# Patient Record
Sex: MALE | Race: WHITE | NOT HISPANIC OR LATINO | Employment: STUDENT | URBAN - METROPOLITAN AREA
[De-identification: names, ages, dates, MRNs, and addresses within clinical notes are randomized per-mention and may not be internally consistent; named-entity substitution may affect disease eponyms.]

---

## 2024-02-02 ENCOUNTER — APPOINTMENT (EMERGENCY)
Dept: ULTRASOUND IMAGING | Facility: HOSPITAL | Age: 19
End: 2024-02-02
Payer: COMMERCIAL

## 2024-02-02 ENCOUNTER — ANESTHESIA EVENT (OUTPATIENT)
Dept: PERIOP | Facility: HOSPITAL | Age: 19
End: 2024-02-02
Payer: COMMERCIAL

## 2024-02-02 ENCOUNTER — ANESTHESIA (OUTPATIENT)
Dept: PERIOP | Facility: HOSPITAL | Age: 19
End: 2024-02-02
Payer: COMMERCIAL

## 2024-02-02 ENCOUNTER — HOSPITAL ENCOUNTER (OUTPATIENT)
Facility: HOSPITAL | Age: 19
Setting detail: OUTPATIENT SURGERY
Discharge: HOME | End: 2024-02-03
Attending: UROLOGY | Admitting: UROLOGY
Payer: COMMERCIAL

## 2024-02-02 ENCOUNTER — HOSPITAL ENCOUNTER (EMERGENCY)
Facility: HOSPITAL | Age: 19
End: 2024-02-02
Attending: EMERGENCY MEDICINE
Payer: COMMERCIAL

## 2024-02-02 VITALS
SYSTOLIC BLOOD PRESSURE: 108 MMHG | DIASTOLIC BLOOD PRESSURE: 56 MMHG | TEMPERATURE: 98.1 F | HEIGHT: 66 IN | RESPIRATION RATE: 18 BRPM | OXYGEN SATURATION: 96 % | HEART RATE: 72 BPM

## 2024-02-02 DIAGNOSIS — N44.00 TESTICULAR TORSION: ICD-10-CM

## 2024-02-02 DIAGNOSIS — N44.00 RIGHT TESTICULAR TORSION: ICD-10-CM

## 2024-02-02 DIAGNOSIS — N44.00 RIGHT TESTICULAR TORSION: Primary | ICD-10-CM

## 2024-02-02 LAB
ALBUMIN SERPL BCP-MCNC: 4.8 G/DL (ref 3.5–5)
ALP SERPL-CCNC: 85 U/L (ref 34–104)
ALT SERPL W P-5'-P-CCNC: 15 U/L (ref 7–52)
ANION GAP SERPL CALCULATED.3IONS-SCNC: 8 MMOL/L
AST SERPL W P-5'-P-CCNC: 18 U/L (ref 13–39)
BASOPHILS # BLD AUTO: 0.06 THOUSANDS/ÂΜL (ref 0–0.1)
BASOPHILS NFR BLD AUTO: 1 % (ref 0–1)
BILIRUB SERPL-MCNC: 0.62 MG/DL (ref 0.2–1)
BUN SERPL-MCNC: 15 MG/DL (ref 5–25)
CALCIUM SERPL-MCNC: 10.1 MG/DL (ref 8.4–10.2)
CHLORIDE SERPL-SCNC: 104 MMOL/L (ref 96–108)
CO2 SERPL-SCNC: 29 MMOL/L (ref 21–32)
CREAT SERPL-MCNC: 0.9 MG/DL (ref 0.6–1.3)
EOSINOPHIL # BLD AUTO: 0.08 THOUSAND/ÂΜL (ref 0–0.61)
EOSINOPHIL NFR BLD AUTO: 1 % (ref 0–6)
ERYTHROCYTE [DISTWIDTH] IN BLOOD BY AUTOMATED COUNT: 13.2 % (ref 11.6–15.1)
GFR SERPL CREATININE-BSD FRML MDRD: 122 ML/MIN/1.73SQ M
GLUCOSE SERPL-MCNC: 94 MG/DL (ref 65–140)
HCT VFR BLD AUTO: 48.9 % (ref 36.5–49.3)
HGB BLD-MCNC: 16 G/DL (ref 12–17)
IMM GRANULOCYTES # BLD AUTO: 0.05 THOUSAND/UL (ref 0–0.2)
IMM GRANULOCYTES NFR BLD AUTO: 0 % (ref 0–2)
LYMPHOCYTES # BLD AUTO: 3.59 THOUSANDS/ÂΜL (ref 0.6–4.47)
LYMPHOCYTES NFR BLD AUTO: 27 % (ref 14–44)
MCH RBC QN AUTO: 28.5 PG (ref 26.8–34.3)
MCHC RBC AUTO-ENTMCNC: 32.7 G/DL (ref 31.4–37.4)
MCV RBC AUTO: 87 FL (ref 82–98)
MONOCYTES # BLD AUTO: 1.15 THOUSAND/ÂΜL (ref 0.17–1.22)
MONOCYTES NFR BLD AUTO: 9 % (ref 4–12)
NEUTROPHILS # BLD AUTO: 8.24 THOUSANDS/ÂΜL (ref 1.85–7.62)
NEUTS SEG NFR BLD AUTO: 62 % (ref 43–75)
NRBC BLD AUTO-RTO: 0 /100 WBCS
PLATELET # BLD AUTO: 283 THOUSANDS/UL (ref 149–390)
PMV BLD AUTO: 8.9 FL (ref 8.9–12.7)
POTASSIUM SERPL-SCNC: 3.9 MMOL/L (ref 3.5–5.3)
PROT SERPL-MCNC: 7.9 G/DL (ref 6.4–8.4)
RBC # BLD AUTO: 5.62 MILLION/UL (ref 3.88–5.62)
SODIUM SERPL-SCNC: 141 MMOL/L (ref 135–147)
WBC # BLD AUTO: 13.17 THOUSAND/UL (ref 4.31–10.16)

## 2024-02-02 PROCEDURE — 80053 COMPREHEN METABOLIC PANEL: CPT | Performed by: EMERGENCY MEDICINE

## 2024-02-02 PROCEDURE — 85025 COMPLETE CBC W/AUTO DIFF WBC: CPT | Performed by: EMERGENCY MEDICINE

## 2024-02-02 PROCEDURE — 88305 TISSUE EXAM BY PATHOLOGIST: CPT | Performed by: PATHOLOGY

## 2024-02-02 PROCEDURE — 54520 REMOVAL OF TESTIS: CPT | Performed by: UROLOGY

## 2024-02-02 PROCEDURE — 76870 US EXAM SCROTUM: CPT

## 2024-02-02 PROCEDURE — NC001 PR NO CHARGE: Performed by: UROLOGY

## 2024-02-02 PROCEDURE — 99284 EMERGENCY DEPT VISIT MOD MDM: CPT

## 2024-02-02 PROCEDURE — 99291 CRITICAL CARE FIRST HOUR: CPT | Performed by: EMERGENCY MEDICINE

## 2024-02-02 PROCEDURE — 54640 ORCHIOPEXY INGUN/SCROT APPR: CPT | Performed by: UROLOGY

## 2024-02-02 PROCEDURE — 36415 COLL VENOUS BLD VENIPUNCTURE: CPT | Performed by: EMERGENCY MEDICINE

## 2024-02-02 PROCEDURE — 88342 IMHCHEM/IMCYTCHM 1ST ANTB: CPT | Performed by: PATHOLOGY

## 2024-02-02 RX ORDER — SUCCINYLCHOLINE/SOD CL,ISO/PF 100 MG/5ML
SYRINGE (ML) INTRAVENOUS AS NEEDED
Status: DISCONTINUED | OUTPATIENT
Start: 2024-02-02 | End: 2024-02-02

## 2024-02-02 RX ORDER — ONDANSETRON 2 MG/ML
4 INJECTION INTRAMUSCULAR; INTRAVENOUS ONCE AS NEEDED
Status: DISCONTINUED | OUTPATIENT
Start: 2024-02-02 | End: 2024-02-02 | Stop reason: HOSPADM

## 2024-02-02 RX ORDER — BUPIVACAINE HYDROCHLORIDE 5 MG/ML
INJECTION, SOLUTION EPIDURAL; INTRACAUDAL AS NEEDED
Status: DISCONTINUED | OUTPATIENT
Start: 2024-02-02 | End: 2024-02-02 | Stop reason: HOSPADM

## 2024-02-02 RX ORDER — CEPHALEXIN 500 MG/1
500 CAPSULE ORAL EVERY 6 HOURS SCHEDULED
Status: DISCONTINUED | OUTPATIENT
Start: 2024-02-03 | End: 2024-02-03 | Stop reason: HOSPADM

## 2024-02-02 RX ORDER — PROPOFOL 10 MG/ML
INJECTION, EMULSION INTRAVENOUS AS NEEDED
Status: DISCONTINUED | OUTPATIENT
Start: 2024-02-02 | End: 2024-02-02

## 2024-02-02 RX ORDER — FENTANYL CITRATE 50 UG/ML
INJECTION, SOLUTION INTRAMUSCULAR; INTRAVENOUS AS NEEDED
Status: DISCONTINUED | OUTPATIENT
Start: 2024-02-02 | End: 2024-02-02

## 2024-02-02 RX ORDER — DEXAMETHASONE SODIUM PHOSPHATE 10 MG/ML
INJECTION, SOLUTION INTRAMUSCULAR; INTRAVENOUS AS NEEDED
Status: DISCONTINUED | OUTPATIENT
Start: 2024-02-02 | End: 2024-02-02

## 2024-02-02 RX ORDER — SODIUM CHLORIDE, SODIUM LACTATE, POTASSIUM CHLORIDE, CALCIUM CHLORIDE 600; 310; 30; 20 MG/100ML; MG/100ML; MG/100ML; MG/100ML
INJECTION, SOLUTION INTRAVENOUS CONTINUOUS PRN
Status: DISCONTINUED | OUTPATIENT
Start: 2024-02-02 | End: 2024-02-02

## 2024-02-02 RX ORDER — FENTANYL CITRATE/PF 50 MCG/ML
50 SYRINGE (ML) INJECTION
Status: DISCONTINUED | OUTPATIENT
Start: 2024-02-02 | End: 2024-02-02 | Stop reason: HOSPADM

## 2024-02-02 RX ORDER — HYDROMORPHONE HCL/PF 1 MG/ML
0.5 SYRINGE (ML) INJECTION
Status: DISCONTINUED | OUTPATIENT
Start: 2024-02-02 | End: 2024-02-02 | Stop reason: HOSPADM

## 2024-02-02 RX ORDER — CEFAZOLIN SODIUM 1 G/50ML
1000 SOLUTION INTRAVENOUS ONCE
Status: DISCONTINUED | OUTPATIENT
Start: 2024-02-02 | End: 2024-02-02

## 2024-02-02 RX ORDER — MAGNESIUM HYDROXIDE 1200 MG/15ML
LIQUID ORAL AS NEEDED
Status: DISCONTINUED | OUTPATIENT
Start: 2024-02-02 | End: 2024-02-02 | Stop reason: HOSPADM

## 2024-02-02 RX ORDER — HYDROMORPHONE HYDROCHLORIDE 2 MG/ML
INJECTION, SOLUTION INTRAMUSCULAR; INTRAVENOUS; SUBCUTANEOUS AS NEEDED
Status: DISCONTINUED | OUTPATIENT
Start: 2024-02-02 | End: 2024-02-02

## 2024-02-02 RX ORDER — ONDANSETRON 2 MG/ML
INJECTION INTRAMUSCULAR; INTRAVENOUS AS NEEDED
Status: DISCONTINUED | OUTPATIENT
Start: 2024-02-02 | End: 2024-02-02

## 2024-02-02 RX ORDER — HYDROCODONE BITARTRATE AND ACETAMINOPHEN 5; 325 MG/1; MG/1
1 TABLET ORAL EVERY 6 HOURS PRN
Status: DISCONTINUED | OUTPATIENT
Start: 2024-02-02 | End: 2024-02-03 | Stop reason: HOSPADM

## 2024-02-02 RX ORDER — MIDAZOLAM HYDROCHLORIDE 2 MG/2ML
INJECTION, SOLUTION INTRAMUSCULAR; INTRAVENOUS AS NEEDED
Status: DISCONTINUED | OUTPATIENT
Start: 2024-02-02 | End: 2024-02-02

## 2024-02-02 RX ORDER — CEFAZOLIN SODIUM 1 G/50ML
1000 SOLUTION INTRAVENOUS ONCE
Status: CANCELLED | OUTPATIENT
Start: 2024-02-02 | End: 2024-02-02

## 2024-02-02 RX ADMIN — HYDROMORPHONE HYDROCHLORIDE 0.5 MG: 2 INJECTION, SOLUTION INTRAMUSCULAR; INTRAVENOUS; SUBCUTANEOUS at 22:05

## 2024-02-02 RX ADMIN — Medication 100 MG: at 21:48

## 2024-02-02 RX ADMIN — SODIUM CHLORIDE 4 MCG: 9 INJECTION, SOLUTION INTRAVENOUS at 22:24

## 2024-02-02 RX ADMIN — CEPHALEXIN 500 MG: 500 CAPSULE ORAL at 23:46

## 2024-02-02 RX ADMIN — FENTANYL CITRATE 50 MCG: 50 INJECTION INTRAMUSCULAR; INTRAVENOUS at 21:59

## 2024-02-02 RX ADMIN — ONDANSETRON 4 MG: 2 INJECTION INTRAMUSCULAR; INTRAVENOUS at 21:48

## 2024-02-02 RX ADMIN — DEXAMETHASONE SODIUM PHOSPHATE 10 MG: 10 INJECTION, SOLUTION INTRAMUSCULAR; INTRAVENOUS at 21:48

## 2024-02-02 RX ADMIN — PROPOFOL 200 MG: 10 INJECTION, EMULSION INTRAVENOUS at 21:48

## 2024-02-02 RX ADMIN — MIDAZOLAM 2 MG: 1 INJECTION INTRAMUSCULAR; INTRAVENOUS at 21:48

## 2024-02-02 RX ADMIN — FENTANYL CITRATE 50 MCG: 50 INJECTION INTRAMUSCULAR; INTRAVENOUS at 21:48

## 2024-02-02 RX ADMIN — SODIUM CHLORIDE, SODIUM LACTATE, POTASSIUM CHLORIDE, AND CALCIUM CHLORIDE: .6; .31; .03; .02 INJECTION, SOLUTION INTRAVENOUS at 21:48

## 2024-02-02 RX ADMIN — Medication 3000 MG: at 21:59

## 2024-02-02 RX ADMIN — SODIUM CHLORIDE 8 MCG: 9 INJECTION, SOLUTION INTRAVENOUS at 22:22

## 2024-02-02 NOTE — LETTER
[facility]  [address]  [place] [address]  Dept: [phone]    February 3, 2024     Patient: Ramesh Rubin   YOB: 2005   Date of Visit: 2/2/2024       To Whom it May Concern:    Ramesh Rubin is under my professional care. He was seen in the hospital from 2/2/2024 to 02/03/24. He  may return to school and work on 2/5/2024 no heavy lifting greater than 15 pounds x 2 weeks.  Additional documentation/note will be provided at follow-up appointment.  No strenuous activity/sports x 2 weeks. .    If you have any questions or concerns, please don't hesitate to call.      Sincerely,        Paige Myers PA-C  [facility]

## 2024-02-02 NOTE — ED PROVIDER NOTES
History  Chief Complaint   Patient presents with    Testicle Pain     Pt states that on Wednesday he was working out and injured his right testicle. Yesterday he started having pain. Denies any abd pain.      Patient is a 18-year-old male.  He thinks he might of injured his right testicle lifting weights at the gym.  When he woke up yesterday at 8 AM, he had right testicle pain and swelling.  He was seen in urgent care today.  He was sent to the emergency room for an emergency ultrasound.  He has no prior history of testicular problems.  No abdominal pain.  No nausea or vomiting.  No fever or chills.  No penile discharge.  He is not sexually active.  No dysuria or hematuria.        None       History reviewed. No pertinent past medical history.    History reviewed. No pertinent surgical history.    History reviewed. No pertinent family history.  I have reviewed and agree with the history as documented.    E-Cigarette/Vaping     E-Cigarette/Vaping Substances          Review of Systems   Constitutional:  Negative for chills and fever.   HENT:  Negative for rhinorrhea and sore throat.    Eyes:  Negative for pain, redness and visual disturbance.   Respiratory:  Negative for cough and shortness of breath.    Cardiovascular:  Negative for chest pain and leg swelling.   Gastrointestinal:  Negative for abdominal pain, diarrhea and vomiting.   Endocrine: Negative for polydipsia and polyuria.   Genitourinary:  Positive for testicular pain. Negative for dysuria, frequency and hematuria.   Musculoskeletal:  Negative for back pain and neck pain.   Skin:  Negative for rash and wound.   Allergic/Immunologic: Negative for immunocompromised state.   Neurological:  Negative for weakness, numbness and headaches.   Psychiatric/Behavioral:  Negative for hallucinations and suicidal ideas.    All other systems reviewed and are negative.      Physical Exam  Physical Exam  Vitals reviewed.   Constitutional:       General: He is not in acute  distress.  HENT:      Head: Normocephalic and atraumatic.      Nose: Nose normal.      Mouth/Throat:      Mouth: Mucous membranes are moist.   Eyes:      General:         Right eye: No discharge.         Left eye: No discharge.      Conjunctiva/sclera: Conjunctivae normal.   Pulmonary:      Effort: Pulmonary effort is normal. No respiratory distress.   Abdominal:      General: Bowel sounds are normal. There is no distension.      Palpations: Abdomen is soft.      Tenderness: There is no abdominal tenderness.   Genitourinary:     Comments: There are no inguinal hernias.  Right testicle is tender and swollen.  Left testicle is normal.  Musculoskeletal:         General: No deformity or signs of injury.      Cervical back: Normal range of motion and neck supple.   Skin:     General: Skin is warm and dry.      Coloration: Skin is not pale.   Neurological:      General: No focal deficit present.      Mental Status: He is alert and oriented to person, place, and time.   Psychiatric:         Mood and Affect: Mood normal.         Behavior: Behavior normal.         Vital Signs  ED Triage Vitals [02/02/24 1720]   Temperature Pulse Respirations Blood Pressure SpO2   97.8 °F (36.6 °C) 74 20 134/84 99 %      Temp Source Heart Rate Source Patient Position - Orthostatic VS BP Location FiO2 (%)   Skin Monitor Sitting Right arm --      Pain Score       --           Vitals:    02/02/24 1720   BP: 134/84   Pulse: 74   Patient Position - Orthostatic VS: Sitting         Visual Acuity      ED Medications  Medications - No data to display    Diagnostic Studies  Results Reviewed       None                   US scrotum and testicles    (Results Pending)              Procedures  CriticalCare Time    Date/Time: 2/2/2024 8:26 PM    Performed by: Wayne Gee MD  Authorized by: Wayne Gee MD    Critical care provider statement:     Critical care time (minutes):  45    Critical care time was exclusive of:  Separately billable procedures  and treating other patients    Critical care was necessary to treat or prevent imminent or life-threatening deterioration of the following conditions: Emergency surgery.    Critical care was time spent personally by me on the following activities:  Obtaining history from patient or surrogate, development of treatment plan with patient or surrogate, discussions with consultants, examination of patient, ordering and review of laboratory studies, ordering and review of radiographic studies and re-evaluation of patient's condition    I assumed direction of critical care for this patient from another provider in my specialty: no             ED Course                                             Medical Decision Making  There was no epididymitis.  There was torsion.  Consulted with urology.  No urology at this facility.  Patient will require priority 1 transfer to [place] for emergency surgery.  The goal at this time is of questionable viability as torsion has been going on greater than 24 hours.    Amount and/or Complexity of Data Reviewed  Labs: ordered.  Radiology: ordered. Decision-making details documented in ED Course.  Discussion of management or test interpretation with external provider(s): Urology    Risk  Decision regarding hospitalization.             Disposition  Final diagnoses:   None     ED Disposition       None          Follow-up Information    None         Patient's Medications    No medications on file       No discharge procedures on file.    PDMP Review       None            ED Provider  Electronically Signed by             Wayne Gee MD  02/03/24 9526

## 2024-02-03 ENCOUNTER — TELEPHONE (OUTPATIENT)
Dept: OTHER | Facility: HOSPITAL | Age: 19
End: 2024-02-03

## 2024-02-03 VITALS
OXYGEN SATURATION: 97 % | DIASTOLIC BLOOD PRESSURE: 51 MMHG | HEIGHT: 66 IN | HEART RATE: 97 BPM | WEIGHT: 209 LBS | RESPIRATION RATE: 18 BRPM | BODY MASS INDEX: 33.59 KG/M2 | SYSTOLIC BLOOD PRESSURE: 102 MMHG | TEMPERATURE: 98.1 F

## 2024-02-03 PROCEDURE — 99024 POSTOP FOLLOW-UP VISIT: CPT | Performed by: PHYSICIAN ASSISTANT

## 2024-02-03 RX ORDER — ACETAMINOPHEN 500 MG
500 TABLET ORAL EVERY 6 HOURS PRN
Qty: 30 TABLET | Refills: 0 | Status: SHIPPED | OUTPATIENT
Start: 2024-02-03

## 2024-02-03 RX ORDER — SULFAMETHOXAZOLE AND TRIMETHOPRIM 800; 160 MG/1; MG/1
1 TABLET ORAL EVERY 12 HOURS SCHEDULED
Qty: 6 TABLET | Refills: 0 | Status: SHIPPED | OUTPATIENT
Start: 2024-02-03 | End: 2024-02-06

## 2024-02-03 RX ORDER — CEPHALEXIN 500 MG/1
500 CAPSULE ORAL EVERY 6 HOURS SCHEDULED
Qty: 12 CAPSULE | Refills: 0 | Status: CANCELLED | OUTPATIENT
Start: 2024-02-03 | End: 2024-02-06

## 2024-02-03 RX ORDER — HYDROCODONE BITARTRATE AND ACETAMINOPHEN 5; 325 MG/1; MG/1
1 TABLET ORAL EVERY 6 HOURS PRN
Qty: 8 TABLET | Refills: 0 | Status: SHIPPED | OUTPATIENT
Start: 2024-02-03

## 2024-02-03 RX ADMIN — CEPHALEXIN 500 MG: 500 CAPSULE ORAL at 06:07

## 2024-02-03 NOTE — H&P
H&P Exam - Urology   Ramesh Rubin 2005 [id number]      Assessment/Plan     Assessment:  Right testicular torsion,36 hr duration, proven on US  Plan:  Scrotal exploration, bilateral orchiopexies, possible right orchiectomy.    History of Present Illness   HPI:  The patient presents transfer for management of right testicular torsion.  The patient awoke at 8 AM yesterday, 36 hours ago with right testicular pain and swelling.  He believed it was from working out.  He presented to the emergency room at [facility]'s evening, underwent ultrasound which shows decreased vascularity to the right testicle with swelling, consistent with torsion. The risks of bleeding, infection, the testicle and need for additional procedures has been explained and informed consent given.    No past medical history on file.    No past surgical history on file.    shoulder    Review of systems:    General: No fever.  CVS: No chest pain or new SOB.  Abdomen: No diarrhea or blood in stool.  : No new voiding difficulties.  Neuro: No syncope/weakness/loss of sensation/paresis  Ophthalmologic: No new visual changes.  Ortho: No new back/joint pains.    Social History- as per previous notes.        Family History: non-contributory    Meds/Allergies   all medications and allergies reviewed      Objective   Vitals: There were no vitals taken for this visit.    No intake/output data recorded.          Physical Exam:    Awake, alert, in NAD.    HEENT: Atraumatic, Normocephalic    Lungs: Normal Respiratory effort, no wheezes,stridor or rales.    CVS- RRR    Abdomen: Nondistended.    Extremiites: Normal ROM, no cyanosis/edema.      Lab Results: I have personally reviewed pertinent reports.    Imaging: I have personally reviewed pertinent reports.   and I have personally reviewed pertinent films in PACS

## 2024-02-03 NOTE — PROGRESS NOTES
"Progress Note -urology  Ramesh Rubin 18 y.o. male MRN: [id number]  Unit/Bed#: [facility] 625-01 Encounter: [id number]    Assessment & Plan:  18-year-old male presenting to emergency room due to testicular pain which occurred yesterday at 8 AM , ultrasound consistent with right testicular torsion:    -Now postop day 1 right orchiectomy with left orchiopexy.  -Intraoperative findings revealed torsed right testicle with testicular demise  -Surgical incision intact, dry, no send wound has not or underlying infection, appropriate incisional tenderness.  New gauze and packing placed.  -Patient kept overnight for pain control.  -Encourage ambulation  -Provide good bowel regimen  -Scrotal support  -Cleared from urologic standpoint for discharge      Subjective/Objective   Chief Complaint:     Subjective:   Evaluated patient at bedside, pain well-controlled.  Denies any nausea or vomiting.  Slept well.    Objective:     Blood pressure 108/52, pulse 62, temperature (!) 97.1 °F (36.2 °C), temperature source Oral, resp. rate 18, height 5' 6\" (1.676 m), weight 94.8 kg (209 lb), SpO2 98%.,Body mass index is 33.73 kg/m².      Intake/Output Summary (Last 24 hours) at 2/3/2024 0351  Last data filed at 2/3/2024 0200  Gross per 24 hour   Intake 800 ml   Output 275 ml   Net 525 ml       Invasive Devices       Peripheral Intravenous Line  Duration             Peripheral IV 02/02/24 Right Antecubital <1 day                  Physical Exam  Constitutional:       General: He is not in acute distress.     Appearance: He is normal weight. He is not ill-appearing, toxic-appearing or diaphoretic.   HENT:      Head: Normocephalic and atraumatic.      Right Ear: External ear normal.      Left Ear: External ear normal.      Nose: Nose normal.      Mouth/Throat:      Pharynx: Oropharynx is clear.   Eyes:      Conjunctiva/sclera: Conjunctivae normal.   Cardiovascular:      Rate and Rhythm: Normal rate and regular rhythm.      Pulses: Normal pulses. "   Pulmonary:      Effort: Pulmonary effort is normal. No respiratory distress.   Abdominal:      General: There is no distension.      Palpations: Abdomen is soft.      Tenderness: There is no abdominal tenderness.   Genitourinary:     Comments: Status post right orchiectomy, testicle present, surgical incision intact, no sign 1 dehiscence or underlying infection, new dressing placed.  Jockstrap secured.  Neurological:      Mental Status: He is alert.           Lab, Imaging and other studies:  Lab Results   Component Value Date    WBC 13.17 (H) 02/02/2024    HGB 16.0 02/02/2024    HCT 48.9 02/02/2024    MCV 87 02/02/2024     02/02/2024     Lab Results   Component Value Date    SODIUM 141 02/02/2024    K 3.9 02/02/2024     02/02/2024    CO2 29 02/02/2024    BUN 15 02/02/2024    CREATININE 0.90 02/02/2024    GLUC 94 02/02/2024    CALCIUM 10.1 02/02/2024       VTE Pharmacologic Prophylaxis: Reason for no pharmacologic prophylaxis low risk  VTE Mechanical Prophylaxis: sequential compression device    Paige Myers PA-C

## 2024-02-03 NOTE — OP NOTE
OPERATIVE REPORT  PATIENT NAME: Ramesh Rubin    :  2005  MRN: [id number]  Pt Location: BE OR ROOM 18    SURGERY DATE: 2024    Surgeons and Role:     * Rambo Patricio MD - Primary    Preop Diagnosis:  Right testicular torsion [N44.00]    Post-Op Diagnosis Codes:     * Right testicular torsion [N44.00] with right testicular demise    Procedure(s):  Right - Right orchiectomy and left orchiplexy    Specimen(s):  ID Type Source Tests Collected by Time Destination   1 : Right testicle Tissue Testis, Right TISSUE EXAM Rambo Patricio MD 2024 2211        Estimated Blood Loss:   Minimal    Drains:  * No LDAs found *    Anesthesia Type:   General    Operative Indications:  Right testicular torsion [N44.00]      Operative Findings:  Torsed right testicle with testicular demise    Complications:   None    Procedure and Technique:  18-year-old man with testicular torsion on the right, 36-hour duration.  Torsion confirmed but on ultrasound.  Offered scrotal exploration with bilateral orchiopexy's and right orchiectomy if indicated.  Discussed fertility, etc.  The risks of bleeding infection damage to adjacent organs and need for additional procedures were explained and he gives informed consent.    Patient was brought to the operating identified properly.  He was placed under LMA in the supine position and prepped and draped, and a timeout was formed.  0.5% bupivacaine was injected into the median raphae of the scrotum and bilateral cord blocks.  This is for 10 cc.  I then made incision in the median raphae down to the tunica vaginalis on the right side and expose the testicle.  The testicle was dark and heavily twisted.  There appeared to be no viability to it.  I therefore placed a Shayla clamp on the cord where it was healthy and divided the cord and sent the testicle off for pathology, then used a stick tie of 0 silk on the cord itself and then oversewed the end of the cord.  Hemo-stasis was excellent.  And tunica  vaginalis on each side of the testicle and then a small bite of the tunica albuginea of the testicle itself to secure it. I then opened up the tunica vaginalis on the left side and performed left orchiopexy with two 3-0 Prolene sutures that went from the These were tied down and the testicle was secured.  The dead space was closed with 3-0 Monocryl on both sides, and the dartos fascia was closed with 3 oh interlocking sutures of 3-0 Monocryl and the skin was closed with running simple 3-0 Monocryl.  All sponge and needle counts were correct at the end of procedure.  Fluffy dressings were applied and a jockstrap.     I was present for the entire procedure. and A qualified resident physician was not available.    Patient Disposition:  PACU  and extubated and stable        SIGNATURE: Rambo Patricio MD  DATE: February 2, 2024  TIME: 10:33 PM

## 2024-02-03 NOTE — PLAN OF CARE
Problem: INFECTION - ADULT  Goal: Absence or prevention of progression during hospitalization  Description: INTERVENTIONS:  - Assess and monitor for signs and symptoms of infection  - Monitor lab/diagnostic results  - Monitor all insertion sites, i.e. indwelling lines, tubes, and drains  - Monitor endotracheal if appropriate and nasal secretions for changes in amount and color  - [place] appropriate cooling/warming therapies per order  - Administer medications as ordered  - Instruct and encourage patient and family to use good hand hygiene technique  - Identify and instruct in appropriate isolation precautions for identified infection/condition  Outcome: Progressing  Goal: Absence of fever/infection during neutropenic period  Description: INTERVENTIONS:  - Monitor WBC    Outcome: Progressing   Problem: PAIN - ADULT  Goal: Verbalizes/displays adequate comfort level or baseline comfort level  Description: Interventions:  - Encourage patient to monitor pain and request assistance  - Assess pain using appropriate pain scale  - Administer analgesics based on type and severity of pain and evaluate response  - Implement non-pharmacological measures as appropriate and evaluate response  - Consider cultural and social influences on pain and pain management  - Notify physician/advanced practitioner if interventions unsuccessful or patient reports new pain  Outcome: Progressing

## 2024-02-03 NOTE — ANESTHESIA PREPROCEDURE EVALUATION
"Procedure:  REPAIR TRANSPOSITION SCROTAL/TESTICLE TORSION (Right: Scrotum)     - denies any chest pain, palpitations, shortness of breath, syncope, lightheadedness, seizures   - denies any recent infectious symptoms such as fevers, chills, cough   - denies taking any anticoagulation medications or any issues with bleeding, bruising, clotting     - last eaten at around 4pm, plan for RSI/ETT    Relevant Problems   ANESTHESIA (within normal limits)      CARDIO (within normal limits)      ENDO (within normal limits)      GI/HEPATIC (within normal limits)      /RENAL (within normal limits)      GYN (within normal limits)      HEMATOLOGY (within normal limits)      MUSCULOSKELETAL (within normal limits)      NEURO/PSYCH (within normal limits)      PULMONARY (within normal limits)      Genitourinary   (+) Testicular torsion      Lab Results   Component Value Date    WBC 13.17 (H) 02/02/2024    HGB 16.0 02/02/2024    HCT 48.9 02/02/2024    MCV 87 02/02/2024     02/02/2024     Lab Results   Component Value Date    SODIUM 141 02/02/2024    K 3.9 02/02/2024     02/02/2024    CO2 29 02/02/2024    AGAP 8 02/02/2024    BUN 15 02/02/2024    CREATININE 0.90 02/02/2024    GLUC 94 02/02/2024    CALCIUM 10.1 02/02/2024    AST 18 02/02/2024    ALT 15 02/02/2024    ALKPHOS 85 02/02/2024    TP 7.9 02/02/2024    TBILI 0.62 02/02/2024    EGFR 122 02/02/2024     No results found for: \"PTT\"  No results found for: \"INR\", \"PROTIME\"      Physical Exam    Airway    Mallampati score: II  TM Distance: >3 FB       Dental   No notable dental hx     Cardiovascular  Rhythm: regular, Rate: normal, Cardiovascular exam normal    Pulmonary  Pulmonary exam normal Breath sounds clear to auscultation    Other Findings        Anesthesia Plan  ASA Score- 1 Emergent    Anesthesia Type- general with ASA Monitors.         Additional Monitors:     Airway Plan: ETT.           Plan Factors-Exercise tolerance (METS): >4 METS.    Chart reviewed. EKG " reviewed. Imaging results reviewed. Existing labs reviewed. Patient summary reviewed.    Patient is not a current smoker.  Patient did not smoke on day of surgery.    Obstructive sleep apnea risk education given perioperatively.        Induction- intravenous and rapid sequence induction.    Postoperative Plan- Plan for postoperative opioid use.     Informed Consent- Anesthetic plan and risks discussed with patient.  I personally reviewed this patient with the CRNA. Discussed and agreed on the Anesthesia Plan with the CRNA..

## 2024-02-03 NOTE — EMTALA/ACUTE CARE TRANSFER
[facility] EMERGENCY DEPARTMENT  [address] [facility] [address]  Dept: [phone]      EMTALA TRANSFER CONSENT    NAME Ramesh ZALDIVAR 2005                              MRN [id number]    I have been informed of my rights regarding examination, treatment, and transfer   by Dr. Wayne Gee MD    Benefits: Specialized equipment and/or services available at the receiving facility (Include comment)________________________ (Urology)    Risks: Potential for delay in receiving treatment, Potential deterioration of medical condition, Loss of IV, Increased discomfort during transfer, Possible worsening of condition or death during transfer      Consent for Transfer:  I acknowledge that my medical condition has been evaluated and explained to me by the emergency department physician or other qualified medical person and/or my attending physician, who has recommended that I be transferred to the service of  Accepting Physician: isi at Accepting Facility Name, City & State : [facility]. The above potential benefits of such transfer, the potential risks associated with such transfer, and the probable risks of not being transferred have been explained to me, and I fully understand them.  The doctor has explained that, in my case, the benefits of transfer outweigh the risks.  I agree to be transferred.    I authorize the performance of emergency medical procedures and treatments upon me in both transit and upon arrival at the receiving facility.  Additionally, I authorize the release of any and all medical records to the receiving facility and request they be transported with me, if possible.  I understand that the safest mode of transportation during a medical emergency is an ambulance and that the Hospital advocates the use of this mode of transport. Risks of traveling to the receiving facility by car, including absence of  medical control, life sustaining equipment, such as oxygen, and medical personnel has been explained to me and I fully understand them.    (LEXUS CORRECT BOX BELOW)  [  ]  I consent to the stated transfer and to be transported by ambulance/helicopter.  [  ]  I consent to the stated transfer, but refuse transportation by ambulance and accept full responsibility for my transportation by car.  I understand the risks of non-ambulance transfers and I exonerate the Hospital and its staff from any deterioration in my condition that results from this refusal.    X___________________________________________    DATE  24  TIME________  Signature of patient or legally responsible individual signing on patient behalf           RELATIONSHIP TO PATIENT_________________________          Provider Certification    NAME Ramesh Rubin                                        [facility] 2005                              MRN [id number]    A medical screening exam was performed on the above named patient.  Based on the examination:    Condition Necessitating Transfer The primary encounter diagnosis was Right testicular torsion. A diagnosis of Testicular torsion was also pertinent to this visit.    Patient Condition: The patient has been stabilized such that within reasonable medical probability, no material deterioration of the patient condition or the condition of the unborn child(chaim) is likely to result from the transfer    Reason for Transfer: Level of Care needed not available at this facility    Transfer Requirements: Facility [facility]   Space available and qualified personnel available for treatment as acknowledged by    Agreed to accept transfer and to provide appropriate medical treatment as acknowledged by       isi  Appropriate medical records of the examination and treatment of the patient are provided at the time of transfer   STAFF INITIAL WHEN COMPLETED _______  Transfer will be performed by qualified  personnel from    and appropriate transfer equipment as required, including the use of necessary and appropriate life support measures.    Provider Certification: I have examined the patient and explained the following risks and benefits of being transferred/refusing transfer to the patient/family:  General risk, such as traffic hazards, adverse weather conditions, rough terrain or turbulence, possible failure of equipment (including vehicle or aircraft), or consequences of actions of persons outside the control of the transport personnel, Unanticipated needs of medical equipment and personnel during transport, Risk of worsening condition, The possibility of a transport vehicle being unavailable      Based on these reasonable risks and benefits to the patient and/or the unborn child(chaim), and based upon the information available at the time of the patient’s examination, I certify that the medical benefits reasonably to be expected from the provision of appropriate medical treatments at another medical facility outweigh the increasing risks, if any, to the individual’s medical condition, and in the case of labor to the unborn child, from effecting the transfer.    X____________________________________________ DATE 02/02/24        TIME_______      ORIGINAL - SEND TO MEDICAL RECORDS   COPY - SEND WITH PATIENT DURING TRANSFER

## 2024-02-03 NOTE — ANESTHESIA POSTPROCEDURE EVALUATION
Post-Op Assessment Note    CV Status:  Stable  Pain Score: 0    Pain management: adequate       Mental Status:  Sleepy   Hydration Status:  Stable   PONV Controlled:  None   Airway Patency:  Patent     Post Op Vitals Reviewed: Yes    No anethesia notable event occurred.    Staff: Anesthesiologist, CRNA               BP   105/46   Temp 98.2   Pulse 90   Resp 14   SpO2 95

## 2024-02-03 NOTE — DISCHARGE INSTR - AVS FIRST PAGE
No heavy lifting greater than 15 pounds or more than a gallon of milk x 2 weeks.    No strenuous activity x 2 weeks.  Note provided for school and/or work    Will plan for outpatient follow-up in 2 weeks time for wound check    Medications were sent to your pharmacy pain medication and antibiotics for coverage.    May use ice for the first 24 to 48 hours.  Scrotal support (wear jockstrap for 2 to 4 weeks.)  Can anticipate intermittent swelling of the scrotum.    Do not soak in any tub, you may shower soap and water over incisions.

## 2024-02-03 NOTE — DISCHARGE SUMMARY
Discharge Summary - Ramesh Rubin 18 y.o. male MRN: [id number]    [facility] Encounter: [id number]    Admission Date:     Admitting Diagnosis: Right testicular torsion [N44.00]    HPI: ***    Procedures Performed: No orders of the defined types were placed in this encounter.      Summary of Hospital Course: ***    Significant Findings, Care, Treatment and Services Provided: ***    Complications: ***    Discharge Diagnosis: ***    Medical Problems       Resolved Problems  Date Reviewed: 2/2/2024   None         Condition at Discharge: {condition:55484}         Discharge instructions/Information to patient and family:   See after visit summary for information provided to patient and family.      Provisions for Follow-Up Care:  See after visit summary for information related to follow-up care and any pertinent home health orders.      PCP: Demetri Olivas MD    Disposition: {Discharge Disposition:77278}    Planned Readmission: {EXAM; YES/NO:07160}      Discharge Statement   I spent *** minutes discharging the patient. This time was spent on the day of discharge. I had direct contact with the patient on the day of discharge. Additional documentation is required if more than 30 minutes were spent on discharge.     Discharge Medications:  See after visit summary for reconciled discharge medications provided to patient and family.

## 2024-02-03 NOTE — TELEPHONE ENCOUNTER
Ramesh Rubin is a 18-year-old male with testicular torsion taken to operative theater by Dr. Rambo Patricio 2/2 while on call.  Will require 2-week postoperative visit.  Please contact patient with office follow-up.  Thank you.

## 2024-02-05 NOTE — TELEPHONE ENCOUNTER
Attempted to contact preferred phone number in patients chart, however person who answered stated wrong number. Called and left VM for patient on mobile number to return call to follow up post procedure and schedule post op wound check.

## 2024-02-06 NOTE — TELEPHONE ENCOUNTER
Post Op Note    Ramesh Rubin is a 18 y.o. male s/p Right orchiectomy and left orchiplexy (Right: Scrotum)  performed 2/2/2024 by Dr. Patricio while on call.      How would you rate your pain on a scale from 1 to 10, 10 being the worst pain ever? Some scrotal discomfort, but this is being managed with prn medication    Have you had a fever? No    Have your bowel movements been regular? Yes    Do you have any difficulty urinating? No    If the patient has an incision- do you have any redness around the incision or any drainage from the incision? No    Do you have any other questions or concerns that I can address at this time?     Patient doing well s/p procedure. Reviewed medications and scheduled patient for 2 week post op visit for wound check in the [place] office. Office address provided.

## 2024-02-08 ENCOUNTER — TELEPHONE (OUTPATIENT)
Dept: UROLOGY | Facility: CLINIC | Age: 19
End: 2024-02-08

## 2024-02-08 PROCEDURE — 88342 IMHCHEM/IMCYTCHM 1ST ANTB: CPT | Performed by: PATHOLOGY

## 2024-02-08 PROCEDURE — 88305 TISSUE EXAM BY PATHOLOGIST: CPT | Performed by: PATHOLOGY

## 2024-02-08 NOTE — TELEPHONE ENCOUNTER
Called pt left a generic vm encourage pt to call back at the office for his resuls. Left office number [phone].         ----- Message from Rambo Patricio MD sent at 2/8/2024  9:20 AM EST -----  Please let patient know that there is no cancer in the testicle that I removed, just a dead testicle.  This was removed for torsion.

## 2024-02-08 NOTE — RESULT ENCOUNTER NOTE
Please let patient know that there is no cancer in the testicle that I removed, just a dead testicle.  This was removed for torsion.

## 2024-02-23 ENCOUNTER — OFFICE VISIT (OUTPATIENT)
Dept: UROLOGY | Facility: MEDICAL CENTER | Age: 19
End: 2024-02-23

## 2024-02-23 VITALS
DIASTOLIC BLOOD PRESSURE: 78 MMHG | HEIGHT: 66 IN | SYSTOLIC BLOOD PRESSURE: 116 MMHG | HEART RATE: 81 BPM | WEIGHT: 209 LBS | OXYGEN SATURATION: 98 % | RESPIRATION RATE: 18 BRPM | BODY MASS INDEX: 33.59 KG/M2

## 2024-02-23 DIAGNOSIS — N44.00 TESTICULAR TORSION: Primary | ICD-10-CM

## 2024-02-23 PROCEDURE — 99024 POSTOP FOLLOW-UP VISIT: CPT | Performed by: PHYSICIAN ASSISTANT

## 2024-02-23 NOTE — PROGRESS NOTES
2/23/2024      Chief Complaint   Patient presents with    Follow-up         Assessment and Plan    18 y.o. male managed by Dr. Patricio    1.  Right testicular torsion with testicular demise    He is 3 weeks status post right orchiectomy, left orchiopexy.  He is feeling well.  No pain.  Midline scrotal incision is healing well.  There is a small hematoma 2 cm on the right side I expect this to continue to dissolve.  He wishes to return to work, school, gym, and his club baseball team.  I have cleared him for this today.  He will call with a follow-up in a month if he feels the hematoma is not fully resolved.  Anticipate continued progress through normal puberty, testosterone production, fertility.  Can revisit if issues arise down the road.  Would not supplement at this time.        History of Present Illness  Ramesh Rubin is a 18 y.o. male here for evaluation of 2-week postop wound check.  Acute right testicular torsion on February 2.  There is 36 hours symptom duration testicle was nonviable and he underwent right orchiectomy.  A left-sided orchiopexy was performed.  Pathology report was dead/torsed testicle, no cancer.  He has no concerns about his incision.  There is a lump on the right side.  No pain bruising is all resolved.        Review of Systems   Constitutional: Negative.    Respiratory: Negative.     Cardiovascular: Negative.    Genitourinary:  Negative for decreased urine volume, difficulty urinating, dysuria, flank pain, frequency, hematuria, penile swelling, scrotal swelling, testicular pain and urgency.   Musculoskeletal: Negative.            AUA SYMPTOM SCORE      Flowsheet Row Most Recent Value   AUA SYMPTOM SCORE    How often have you had a sensation of not emptying your bladder completely after you finished urinating? 0 (P)    How often have you had to urinate again less than two hours after you finished urinating? 2 (P)    How often have you found you stopped and started again several times when  "you urinate? 1 (P)    How often have you found it difficult to postpone urination? 0 (P)    How often have you had a weak urinary stream? 0 (P)    How often have you had to push or strain to begin urination? 0 (P)    How many times did you most typically get up to urinate from the time you went to bed at night until the time you got up in the morning? 1 (P)    Quality of Life: If you were to spend the rest of your life with your urinary condition just the way it is now, how would you feel about that? 1 (P)    AUA SYMPTOM SCORE 4 (P)              Vitals  Vitals:    02/23/24 0856   BP: 116/78   BP Location: Left arm   Patient Position: Sitting   Cuff Size: Standard   Pulse: 81   Resp: 18   SpO2: 98%   Weight: 94.8 kg (209 lb)   Height: 5' 6\" (1.676 m)       Physical Exam  Vitals and nursing note reviewed.   Constitutional:       General: He is not in acute distress.     Appearance: Normal appearance. He is well-developed. He is not diaphoretic.   HENT:      Head: Normocephalic and atraumatic.   Pulmonary:      Effort: Pulmonary effort is normal.      Comments: No cough or audible wheeze  Abdominal:      General: There is no distension.      Tenderness: There is no abdominal tenderness. There is no right CVA tenderness or left CVA tenderness.   Genitourinary:     Comments: Circumcised penis, normal phallus.  Right testicle absent, 2 cm hematoma the distal end of the cord, midline scrotal incision approximated and healing well overlying ecchymosis is resolved.  There is no redness or drainage.  Left testicle descended, smooth, nontender  Musculoskeletal:      Right lower leg: No edema.      Left lower leg: No edema.   Skin:     General: Skin is warm and dry.   Neurological:      Mental Status: He is alert and oriented to person, place, and time.      Gait: Gait normal.   Psychiatric:         Speech: Speech normal.         Behavior: Behavior normal.           Past History  History reviewed. No pertinent past medical " "history.  Social History     Socioeconomic History    Marital status: Single     Spouse name: None    Number of children: None    Years of education: None    Highest education level: None   Occupational History    None   Tobacco Use    Smoking status: Never    Smokeless tobacco: Never   Substance and Sexual Activity    Alcohol use: Never    Drug use: Never    Sexual activity: None   Other Topics Concern    None   Social History Narrative    None     Social Determinants of Health     Financial Resource Strain: Not on file   Food Insecurity: Not on file   Transportation Needs: Not on file   Physical Activity: Not on file   Stress: Not on file   Social Connections: Not on file   Intimate Partner Violence: Not on file   Housing Stability: Not on file     Social History     Tobacco Use   Smoking Status Never   Smokeless Tobacco Never     History reviewed. No pertinent family history.    The following portions of the patient's history were reviewed and updated as appropriate: allergies, current medications, past medical history, past social history, past surgical history and problem list.    Results  No results found for this or any previous visit (from the past 1 hour(s)).]  No results found for: \"PSA\"  Lab Results   Component Value Date    CALCIUM 10.1 02/02/2024    K 3.9 02/02/2024    CO2 29 02/02/2024     02/02/2024    BUN 15 02/02/2024    CREATININE 0.90 02/02/2024     Lab Results   Component Value Date    WBC 13.17 (H) 02/02/2024    HGB 16.0 02/02/2024    HCT 48.9 02/02/2024    MCV 87 02/02/2024     02/02/2024       "

## (undated) DEVICE — ELECTRODE NEEDLE E-Z CLEAN 2.75IN 7CM -0013

## (undated) DEVICE — RAZOR STERILE

## (undated) DEVICE — SUT MONOCRYL 3-0 PS-2 18 IN Y497G

## (undated) DEVICE — NEEDLE 23G X 1 1/2 SAFETY-GLIDE THIN WALL

## (undated) DEVICE — GAUZE SPONGES,16 PLY: Brand: CURITY

## (undated) DEVICE — SUT SILK 0 CT-1 30 IN 424H

## (undated) DEVICE — PAD GROUNDING ADULT

## (undated) DEVICE — SUT PROLENE 3-0 RB-1 30 IN 8872H

## (undated) DEVICE — ELECTRODE BLADE MOD E-Z CLEAN 2.5IN 6.4CM -0012M

## (undated) DEVICE — BETHLEHEM UNIVERSAL MINOR GEN: Brand: CARDINAL HEALTH

## (undated) DEVICE — GLOVE SRG BIOGEL ECLIPSE 8

## (undated) DEVICE — GLOVE INDICATOR PI UNDERGLOVE SZ 8 BLUE

## (undated) DEVICE — PLUMEPEN PRO 10FT